# Patient Record
(demographics unavailable — no encounter records)

---

## 2024-12-30 NOTE — ASSESSMENT
[FreeTextEntry1] : Impression: Apparent vasovagal syncope after large urgent bowel movement.  Intermittent diarrhea most likely dietary related/FODMAP triggered.  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  Will begin pantoprazole 40 mg daily.  Add neuromodulator if symptoms persist.  Patient advised to see a cardiologist for workup vasovagal syncope if problem persists

## 2024-12-30 NOTE — HISTORY OF PRESENT ILLNESS
[FreeTextEntry1] : Patient with history of GERD/dyspepsia/H. pylori successfully eradicated after multiple attempts, previously seen for chronic diarrhea since [place], presents today for follow-up.  Has been having episodes of urgent watery diarrhea associated with syncopal episodes.  Bowel movements in between episodes are normal.  Has been going on for over a year but had 3 episodes that short intervals recently.  Patient experience chronic abdominal bloating/distention and some chest discomfort.

## 2025-03-27 NOTE — HISTORY OF PRESENT ILLNESS
[FreeTextEntry1] : Diarrheal episodes significantly improved on lactose-free diet.  Did not have syncope with few episodes that occurred.  Episodes occurred after eating ravioli and fettuccine Manjinder.  Seems to tolerate ice cream okay.  GERD symptoms much improved on pantoprazole but still with occasional abdominal bloating/distention

## 2025-03-27 NOTE — ASSESSMENT
[FreeTextEntry1] : Impression: Episodic diarrhea secondary to lactose intolerance improved with diet.  Functional dyspepsia still somewhat symptomatic on pantoprazole daily.  Plan: Will DC daily pantoprazole use on demand only.  Begin amitriptyline 10 mg nightly.  Titrate dose to response as tolerated.  Can try using Lactaid when ingesting milk products to see if tolerated.